# Patient Record
Sex: MALE | Race: BLACK OR AFRICAN AMERICAN | Employment: PART TIME | URBAN - METROPOLITAN AREA
[De-identification: names, ages, dates, MRNs, and addresses within clinical notes are randomized per-mention and may not be internally consistent; named-entity substitution may affect disease eponyms.]

---

## 2019-11-21 ENCOUNTER — HOSPITAL ENCOUNTER (OUTPATIENT)
Age: 49
Setting detail: OBSERVATION
Discharge: HOME | End: 2019-11-22
Attending: EMERGENCY MEDICINE | Admitting: INTERNAL MEDICINE
Payer: SUBSIDIZED

## 2019-11-21 DIAGNOSIS — E11.9 NEW ONSET TYPE 2 DIABETES MELLITUS (HCC): Primary | ICD-10-CM

## 2019-11-21 DIAGNOSIS — E86.0 DEHYDRATION: ICD-10-CM

## 2019-11-21 PROBLEM — E11.65 HYPERGLYCEMIA DUE TO TYPE 2 DIABETES MELLITUS (HCC): Status: ACTIVE | Noted: 2019-11-21

## 2019-11-21 LAB
ALBUMIN SERPL-MCNC: 3.9 G/DL (ref 3.5–5)
ALBUMIN/GLOB SERPL: 0.7 {RATIO} (ref 1.1–2.2)
ALP SERPL-CCNC: 152 U/L (ref 45–117)
ALT SERPL-CCNC: 28 U/L (ref 12–78)
ANION GAP SERPL CALC-SCNC: 17 MMOL/L (ref 5–15)
APPEARANCE UR: CLEAR
AST SERPL-CCNC: 14 U/L (ref 15–37)
BACTERIA URNS QL MICRO: NEGATIVE /HPF
BASOPHILS # BLD: 0 K/UL (ref 0–0.1)
BASOPHILS NFR BLD: 0 % (ref 0–1)
BILIRUB SERPL-MCNC: 0.7 MG/DL (ref 0.2–1)
BILIRUB UR QL: NEGATIVE
BUN SERPL-MCNC: 11 MG/DL (ref 6–20)
BUN/CREAT SERPL: 8 (ref 12–20)
CALCIUM SERPL-MCNC: 9.9 MG/DL (ref 8.5–10.1)
CHLORIDE SERPL-SCNC: 96 MMOL/L (ref 97–108)
CO2 SERPL-SCNC: 19 MMOL/L (ref 21–32)
COLOR UR: ABNORMAL
COMMENT, HOLDF: NORMAL
CREAT SERPL-MCNC: 1.38 MG/DL (ref 0.7–1.3)
DIFFERENTIAL METHOD BLD: ABNORMAL
EOSINOPHIL # BLD: 0 K/UL (ref 0–0.4)
EOSINOPHIL NFR BLD: 0 % (ref 0–7)
EPITH CASTS URNS QL MICRO: ABNORMAL /LPF
ERYTHROCYTE [DISTWIDTH] IN BLOOD BY AUTOMATED COUNT: 11.8 % (ref 11.5–14.5)
GLOBULIN SER CALC-MCNC: 5.9 G/DL (ref 2–4)
GLUCOSE BLD STRIP.AUTO-MCNC: 245 MG/DL (ref 65–100)
GLUCOSE BLD STRIP.AUTO-MCNC: 330 MG/DL (ref 65–100)
GLUCOSE BLD STRIP.AUTO-MCNC: 522 MG/DL (ref 65–100)
GLUCOSE SERPL-MCNC: 510 MG/DL (ref 65–100)
GLUCOSE UR STRIP.AUTO-MCNC: >1000 MG/DL
HCT VFR BLD AUTO: 43.6 % (ref 36.6–50.3)
HGB BLD-MCNC: 14.7 G/DL (ref 12.1–17)
HGB UR QL STRIP: NEGATIVE
HYALINE CASTS URNS QL MICRO: ABNORMAL /LPF (ref 0–5)
IMM GRANULOCYTES # BLD AUTO: 0.1 K/UL (ref 0–0.04)
IMM GRANULOCYTES NFR BLD AUTO: 1 % (ref 0–0.5)
KETONES UR QL STRIP.AUTO: >80 MG/DL
LEUKOCYTE ESTERASE UR QL STRIP.AUTO: NEGATIVE
LYMPHOCYTES # BLD: 3.5 K/UL (ref 0.8–3.5)
LYMPHOCYTES NFR BLD: 41 % (ref 12–49)
MCH RBC QN AUTO: 29.8 PG (ref 26–34)
MCHC RBC AUTO-ENTMCNC: 33.7 G/DL (ref 30–36.5)
MCV RBC AUTO: 88.4 FL (ref 80–99)
MONOCYTES # BLD: 0.7 K/UL (ref 0–1)
MONOCYTES NFR BLD: 8 % (ref 5–13)
NEUTS SEG # BLD: 4.2 K/UL (ref 1.8–8)
NEUTS SEG NFR BLD: 50 % (ref 32–75)
NITRITE UR QL STRIP.AUTO: NEGATIVE
NRBC # BLD: 0 K/UL (ref 0–0.01)
NRBC BLD-RTO: 0 PER 100 WBC
PH UR STRIP: 5 [PH] (ref 5–8)
PLATELET # BLD AUTO: 255 K/UL (ref 150–400)
PMV BLD AUTO: 12.1 FL (ref 8.9–12.9)
POTASSIUM SERPL-SCNC: 4.4 MMOL/L (ref 3.5–5.1)
PROT SERPL-MCNC: 9.8 G/DL (ref 6.4–8.2)
PROT UR STRIP-MCNC: NEGATIVE MG/DL
RBC # BLD AUTO: 4.93 M/UL (ref 4.1–5.7)
RBC #/AREA URNS HPF: ABNORMAL /HPF (ref 0–5)
SAMPLES BEING HELD,HOLD: NORMAL
SERVICE CMNT-IMP: ABNORMAL
SODIUM SERPL-SCNC: 132 MMOL/L (ref 136–145)
SP GR UR REFRACTOMETRY: 1.02 (ref 1–1.03)
TROPONIN I SERPL-MCNC: <0.05 NG/ML
UR CULT HOLD, URHOLD: NORMAL
UROBILINOGEN UR QL STRIP.AUTO: 0.2 EU/DL (ref 0.2–1)
WBC # BLD AUTO: 8.5 K/UL (ref 4.1–11.1)
WBC URNS QL MICRO: ABNORMAL /HPF (ref 0–4)

## 2019-11-21 PROCEDURE — 74011250636 HC RX REV CODE- 250/636: Performed by: EMERGENCY MEDICINE

## 2019-11-21 PROCEDURE — 96360 HYDRATION IV INFUSION INIT: CPT

## 2019-11-21 PROCEDURE — 99218 HC RM OBSERVATION: CPT

## 2019-11-21 PROCEDURE — 99285 EMERGENCY DEPT VISIT HI MDM: CPT

## 2019-11-21 PROCEDURE — 74011636637 HC RX REV CODE- 636/637: Performed by: EMERGENCY MEDICINE

## 2019-11-21 PROCEDURE — 80053 COMPREHEN METABOLIC PANEL: CPT

## 2019-11-21 PROCEDURE — 96361 HYDRATE IV INFUSION ADD-ON: CPT

## 2019-11-21 PROCEDURE — 36415 COLL VENOUS BLD VENIPUNCTURE: CPT

## 2019-11-21 PROCEDURE — 82962 GLUCOSE BLOOD TEST: CPT

## 2019-11-21 PROCEDURE — 81001 URINALYSIS AUTO W/SCOPE: CPT

## 2019-11-21 PROCEDURE — 74011636637 HC RX REV CODE- 636/637: Performed by: INTERNAL MEDICINE

## 2019-11-21 PROCEDURE — 85025 COMPLETE CBC W/AUTO DIFF WBC: CPT

## 2019-11-21 PROCEDURE — 84484 ASSAY OF TROPONIN QUANT: CPT

## 2019-11-21 RX ORDER — SODIUM CHLORIDE 9 MG/ML
75 INJECTION, SOLUTION INTRAVENOUS CONTINUOUS
Status: DISCONTINUED | OUTPATIENT
Start: 2019-11-21 | End: 2019-11-22 | Stop reason: HOSPADM

## 2019-11-21 RX ORDER — INSULIN LISPRO 100 [IU]/ML
2 INJECTION, SOLUTION INTRAVENOUS; SUBCUTANEOUS ONCE
Status: COMPLETED | OUTPATIENT
Start: 2019-11-21 | End: 2019-11-21

## 2019-11-21 RX ORDER — INSULIN LISPRO 100 [IU]/ML
INJECTION, SOLUTION INTRAVENOUS; SUBCUTANEOUS
Status: DISCONTINUED | OUTPATIENT
Start: 2019-11-22 | End: 2019-11-22 | Stop reason: HOSPADM

## 2019-11-21 RX ADMIN — SODIUM CHLORIDE 150 ML/HR: 900 INJECTION, SOLUTION INTRAVENOUS at 21:55

## 2019-11-21 RX ADMIN — HUMAN INSULIN 6 UNITS: 100 INJECTION, SOLUTION SUBCUTANEOUS at 21:51

## 2019-11-21 RX ADMIN — SODIUM CHLORIDE 1000 ML: 900 INJECTION, SOLUTION INTRAVENOUS at 19:42

## 2019-11-21 RX ADMIN — SODIUM CHLORIDE 1000 ML: 900 INJECTION, SOLUTION INTRAVENOUS at 20:23

## 2019-11-21 RX ADMIN — INSULIN LISPRO 2 UNITS: 100 INJECTION, SOLUTION INTRAVENOUS; SUBCUTANEOUS at 23:42

## 2019-11-21 RX ADMIN — HUMAN INSULIN 16 UNITS: 100 INJECTION, SOLUTION SUBCUTANEOUS at 19:56

## 2019-11-21 NOTE — LETTER
NOTIFICATION OF RETURN TO WORK /  
 
11/22/2019 Mr. Mac Dwyer 
[address] [place] [id number] To Whom It May Concern: Mac Dwyer admitted to the hospital on 11/21/2019 and discharged on 11/22/2019 He can return to work on 11/25/2019 If there are questions or concerns please have the patient contact our office. Sincerely, Giovanny Hamm MD

## 2019-11-22 ENCOUNTER — APPOINTMENT (OUTPATIENT)
Dept: GENERAL RADIOLOGY | Age: 49
End: 2019-11-22
Attending: INTERNAL MEDICINE
Payer: SUBSIDIZED

## 2019-11-22 VITALS
OXYGEN SATURATION: 97 % | TEMPERATURE: 98.3 F | HEIGHT: 66 IN | WEIGHT: 190 LBS | BODY MASS INDEX: 30.53 KG/M2 | RESPIRATION RATE: 16 BRPM | HEART RATE: 99 BPM | DIASTOLIC BLOOD PRESSURE: 71 MMHG | SYSTOLIC BLOOD PRESSURE: 108 MMHG

## 2019-11-22 LAB
ALBUMIN SERPL-MCNC: 3 G/DL (ref 3.5–5)
ALBUMIN/GLOB SERPL: 0.6 {RATIO} (ref 1.1–2.2)
ALP SERPL-CCNC: 108 U/L (ref 45–117)
ALT SERPL-CCNC: 22 U/L (ref 12–78)
ANION GAP SERPL CALC-SCNC: 11 MMOL/L (ref 5–15)
AST SERPL-CCNC: 12 U/L (ref 15–37)
ATRIAL RATE: 85 BPM
BASOPHILS # BLD: 0 K/UL (ref 0–0.1)
BASOPHILS NFR BLD: 1 % (ref 0–1)
BILIRUB SERPL-MCNC: 0.5 MG/DL (ref 0.2–1)
BUN SERPL-MCNC: 7 MG/DL (ref 6–20)
BUN/CREAT SERPL: 7 (ref 12–20)
CALCIUM SERPL-MCNC: 8.3 MG/DL (ref 8.5–10.1)
CALCULATED P AXIS, ECG09: 59 DEGREES
CALCULATED R AXIS, ECG10: 34 DEGREES
CALCULATED T AXIS, ECG11: 24 DEGREES
CHLORIDE SERPL-SCNC: 108 MMOL/L (ref 97–108)
CHOLEST SERPL-MCNC: 216 MG/DL
CO2 SERPL-SCNC: 21 MMOL/L (ref 21–32)
CREAT SERPL-MCNC: 1 MG/DL (ref 0.7–1.3)
DIAGNOSIS, 93000: NORMAL
DIFFERENTIAL METHOD BLD: NORMAL
EOSINOPHIL # BLD: 0 K/UL (ref 0–0.4)
EOSINOPHIL NFR BLD: 1 % (ref 0–7)
ERYTHROCYTE [DISTWIDTH] IN BLOOD BY AUTOMATED COUNT: 11.8 % (ref 11.5–14.5)
EST. AVERAGE GLUCOSE BLD GHB EST-MCNC: ABNORMAL MG/DL
GLOBULIN SER CALC-MCNC: 4.8 G/DL (ref 2–4)
GLUCOSE BLD STRIP.AUTO-MCNC: 200 MG/DL (ref 65–100)
GLUCOSE BLD STRIP.AUTO-MCNC: 238 MG/DL (ref 65–100)
GLUCOSE BLD STRIP.AUTO-MCNC: 250 MG/DL (ref 65–100)
GLUCOSE BLD STRIP.AUTO-MCNC: 276 MG/DL (ref 65–100)
GLUCOSE SERPL-MCNC: 175 MG/DL (ref 65–100)
HBA1C MFR BLD: >14 % (ref 4–5.6)
HCT VFR BLD AUTO: 36.9 % (ref 36.6–50.3)
HDLC SERPL-MCNC: 32 MG/DL
HDLC SERPL: 6.8 {RATIO} (ref 0–5)
HGB BLD-MCNC: 12.5 G/DL (ref 12.1–17)
IMM GRANULOCYTES # BLD AUTO: 0 K/UL (ref 0–0.04)
IMM GRANULOCYTES NFR BLD AUTO: 0 % (ref 0–0.5)
LDLC SERPL CALC-MCNC: 137 MG/DL (ref 0–100)
LIPID PROFILE,FLP: ABNORMAL
LYMPHOCYTES # BLD: 2.3 K/UL (ref 0.8–3.5)
LYMPHOCYTES NFR BLD: 43 % (ref 12–49)
MAGNESIUM SERPL-MCNC: 2 MG/DL (ref 1.6–2.4)
MCH RBC QN AUTO: 30 PG (ref 26–34)
MCHC RBC AUTO-ENTMCNC: 33.9 G/DL (ref 30–36.5)
MCV RBC AUTO: 88.7 FL (ref 80–99)
MONOCYTES # BLD: 0.6 K/UL (ref 0–1)
MONOCYTES NFR BLD: 12 % (ref 5–13)
NEUTS SEG # BLD: 2.3 K/UL (ref 1.8–8)
NEUTS SEG NFR BLD: 43 % (ref 32–75)
NRBC # BLD: 0 K/UL (ref 0–0.01)
NRBC BLD-RTO: 0 PER 100 WBC
P-R INTERVAL, ECG05: 134 MS
PHOSPHATE SERPL-MCNC: 2.7 MG/DL (ref 2.6–4.7)
PLATELET # BLD AUTO: 200 K/UL (ref 150–400)
PMV BLD AUTO: 12.1 FL (ref 8.9–12.9)
POTASSIUM SERPL-SCNC: 3.7 MMOL/L (ref 3.5–5.1)
PROT SERPL-MCNC: 7.8 G/DL (ref 6.4–8.2)
Q-T INTERVAL, ECG07: 372 MS
QRS DURATION, ECG06: 90 MS
QTC CALCULATION (BEZET), ECG08: 442 MS
RBC # BLD AUTO: 4.16 M/UL (ref 4.1–5.7)
SERVICE CMNT-IMP: ABNORMAL
SODIUM SERPL-SCNC: 140 MMOL/L (ref 136–145)
TRIGL SERPL-MCNC: 235 MG/DL (ref ?–150)
TROPONIN I SERPL-MCNC: <0.05 NG/ML
TSH SERPL DL<=0.05 MIU/L-ACNC: 2.81 UIU/ML (ref 0.36–3.74)
VENTRICULAR RATE, ECG03: 85 BPM
VLDLC SERPL CALC-MCNC: 47 MG/DL
WBC # BLD AUTO: 5.3 K/UL (ref 4.1–11.1)

## 2019-11-22 PROCEDURE — 74011250636 HC RX REV CODE- 250/636: Performed by: INTERNAL MEDICINE

## 2019-11-22 PROCEDURE — 83036 HEMOGLOBIN GLYCOSYLATED A1C: CPT

## 2019-11-22 PROCEDURE — 36415 COLL VENOUS BLD VENIPUNCTURE: CPT

## 2019-11-22 PROCEDURE — 83735 ASSAY OF MAGNESIUM: CPT

## 2019-11-22 PROCEDURE — 71045 X-RAY EXAM CHEST 1 VIEW: CPT

## 2019-11-22 PROCEDURE — 85025 COMPLETE CBC W/AUTO DIFF WBC: CPT

## 2019-11-22 PROCEDURE — 74011636637 HC RX REV CODE- 636/637: Performed by: HOSPITALIST

## 2019-11-22 PROCEDURE — 80061 LIPID PANEL: CPT

## 2019-11-22 PROCEDURE — 80053 COMPREHEN METABOLIC PANEL: CPT

## 2019-11-22 PROCEDURE — 99218 HC RM OBSERVATION: CPT

## 2019-11-22 PROCEDURE — 84100 ASSAY OF PHOSPHORUS: CPT

## 2019-11-22 PROCEDURE — 96372 THER/PROPH/DIAG INJ SC/IM: CPT

## 2019-11-22 PROCEDURE — 74011250637 HC RX REV CODE- 250/637: Performed by: HOSPITALIST

## 2019-11-22 PROCEDURE — 93005 ELECTROCARDIOGRAM TRACING: CPT

## 2019-11-22 PROCEDURE — 84484 ASSAY OF TROPONIN QUANT: CPT

## 2019-11-22 PROCEDURE — 82962 GLUCOSE BLOOD TEST: CPT

## 2019-11-22 PROCEDURE — 84443 ASSAY THYROID STIM HORMONE: CPT

## 2019-11-22 PROCEDURE — 74011636637 HC RX REV CODE- 636/637: Performed by: INTERNAL MEDICINE

## 2019-11-22 RX ORDER — ACETAMINOPHEN 325 MG/1
650 TABLET ORAL
Status: DISCONTINUED | OUTPATIENT
Start: 2019-11-22 | End: 2019-11-22 | Stop reason: HOSPADM

## 2019-11-22 RX ORDER — SODIUM CHLORIDE 0.9 % (FLUSH) 0.9 %
5-40 SYRINGE (ML) INJECTION AS NEEDED
Status: DISCONTINUED | OUTPATIENT
Start: 2019-11-22 | End: 2019-11-22 | Stop reason: HOSPADM

## 2019-11-22 RX ORDER — ATORVASTATIN CALCIUM 40 MG/1
40 TABLET, FILM COATED ORAL
Qty: 20 TAB | Refills: 0 | Status: SHIPPED | OUTPATIENT
Start: 2019-11-22 | End: 2019-11-22

## 2019-11-22 RX ORDER — LISINOPRIL 5 MG/1
5 TABLET ORAL DAILY
Status: DISCONTINUED | OUTPATIENT
Start: 2019-11-22 | End: 2019-11-22 | Stop reason: HOSPADM

## 2019-11-22 RX ORDER — LISINOPRIL 5 MG/1
5 TABLET ORAL DAILY
Qty: 20 TAB | Refills: 0 | Status: SHIPPED | OUTPATIENT
Start: 2019-11-23

## 2019-11-22 RX ORDER — LISINOPRIL 5 MG/1
5 TABLET ORAL DAILY
Qty: 20 TAB | Refills: 0 | Status: SHIPPED | OUTPATIENT
Start: 2019-11-23 | End: 2019-11-22

## 2019-11-22 RX ORDER — MAGNESIUM SULFATE 100 %
4 CRYSTALS MISCELLANEOUS AS NEEDED
Status: DISCONTINUED | OUTPATIENT
Start: 2019-11-22 | End: 2019-11-22 | Stop reason: HOSPADM

## 2019-11-22 RX ORDER — ATORVASTATIN CALCIUM 40 MG/1
40 TABLET, FILM COATED ORAL
Qty: 20 TAB | Refills: 0 | Status: SHIPPED | OUTPATIENT
Start: 2019-11-22

## 2019-11-22 RX ORDER — BISACODYL 5 MG
5 TABLET, DELAYED RELEASE (ENTERIC COATED) ORAL DAILY PRN
Status: DISCONTINUED | OUTPATIENT
Start: 2019-11-22 | End: 2019-11-22 | Stop reason: HOSPADM

## 2019-11-22 RX ORDER — DEXTROSE MONOHYDRATE 100 MG/ML
0-250 INJECTION, SOLUTION INTRAVENOUS AS NEEDED
Status: DISCONTINUED | OUTPATIENT
Start: 2019-11-22 | End: 2019-11-22 | Stop reason: HOSPADM

## 2019-11-22 RX ORDER — SODIUM CHLORIDE 0.9 % (FLUSH) 0.9 %
5-40 SYRINGE (ML) INJECTION EVERY 8 HOURS
Status: DISCONTINUED | OUTPATIENT
Start: 2019-11-22 | End: 2019-11-22 | Stop reason: HOSPADM

## 2019-11-22 RX ORDER — ATORVASTATIN CALCIUM 40 MG/1
40 TABLET, FILM COATED ORAL
Status: DISCONTINUED | OUTPATIENT
Start: 2019-11-22 | End: 2019-11-22 | Stop reason: HOSPADM

## 2019-11-22 RX ORDER — HEPARIN SODIUM 5000 [USP'U]/ML
5000 INJECTION, SOLUTION INTRAVENOUS; SUBCUTANEOUS EVERY 8 HOURS
Status: DISCONTINUED | OUTPATIENT
Start: 2019-11-22 | End: 2019-11-22 | Stop reason: HOSPADM

## 2019-11-22 RX ORDER — ONDANSETRON 2 MG/ML
4 INJECTION INTRAMUSCULAR; INTRAVENOUS
Status: DISCONTINUED | OUTPATIENT
Start: 2019-11-22 | End: 2019-11-22 | Stop reason: HOSPADM

## 2019-11-22 RX ADMIN — Medication 10 ML: at 14:00

## 2019-11-22 RX ADMIN — HUMAN INSULIN 10 UNITS: 100 INJECTION, SUSPENSION SUBCUTANEOUS at 09:55

## 2019-11-22 RX ADMIN — INSULIN LISPRO 5 UNITS: 100 INJECTION, SOLUTION INTRAVENOUS; SUBCUTANEOUS at 08:53

## 2019-11-22 RX ADMIN — HEPARIN SODIUM 5000 UNITS: 5000 INJECTION INTRAVENOUS; SUBCUTANEOUS at 00:35

## 2019-11-22 RX ADMIN — SODIUM CHLORIDE 150 ML/HR: 900 INJECTION, SOLUTION INTRAVENOUS at 05:13

## 2019-11-22 RX ADMIN — LISINOPRIL 5 MG: 5 TABLET ORAL at 10:01

## 2019-11-22 RX ADMIN — INSULIN LISPRO 5 UNITS: 100 INJECTION, SOLUTION INTRAVENOUS; SUBCUTANEOUS at 12:53

## 2019-11-22 RX ADMIN — HEPARIN SODIUM 5000 UNITS: 5000 INJECTION INTRAVENOUS; SUBCUTANEOUS at 09:55

## 2019-11-22 RX ADMIN — HUMAN INSULIN 14 UNITS: 100 INJECTION, SUSPENSION SUBCUTANEOUS at 17:03

## 2019-11-22 RX ADMIN — INSULIN LISPRO 3 UNITS: 100 INJECTION, SOLUTION INTRAVENOUS; SUBCUTANEOUS at 17:02

## 2019-11-22 NOTE — PROGRESS NOTES
Bedside shift change report given  Irish Coelho RN (oncoming nurse) by Kenneth Molina RN(offgoing nurse). Report given with SBAR.

## 2019-11-22 NOTE — DIABETES MGMT
[facility]    [facility] Consult Note    Recommendations/ Comments: Consult received for new dx. Admitted with BG's > 500 mg/dL yesterday. Improved to 200 mg/dL this AM.  He has received 10 units of lispro correction already today. Noted high BG post lunch = 300 mg/dL by meter when demonstrating meter technique. Pt is self-pay. Recommend  Novolin 70/30 ([employer] Reli On 70/30) with increased dose to 15 units BID on discharge    Dr Anam Banda regarding above    Current hospital DM medication: NPH 10 units BID                                                       Lispro normal sensitivity correction scale    Consult received for:  []             Assessment of home management                []      Medication Recommendations                []             Meter/monitoring     []             Insulin instruction     [x]             New diagnosis     []             Outpatient education     []             Insulin pump patient     []             Insulin infusion     []             DKA/HHS    Chart reviewed and initial evaluation complete on [facility]. Patient is a 47 y.o. male with new dx DM   Reports extreme thirst, dry mouth, frequent urination, decreased energy and weakness    Assessed and instructed patient on the following:   ·  interpretation of lab results, A1c > 14%  · Basic pathophys of DM and   · Hyperglycemia, symptoms  · blood sugar goals,  · complications of diabetes mellitus,  · hypoglycemia prevention and treatment,   · exercise,   · insulin - use of vial & syringe, site selection and rotation, storage of insulin   · illness management,   · SMBG skills,   · nutrition  - he has teat sugar and milk and toast for breakfast, he often skips lunch, he has vegetables and bread for supper. He does not eat sweets. He drinks sweetened tea and fruit juice.  Acceptable drinks choices discussed along with palte method for meal planning timing of meals and needs to eat 3 meals per day discussed. · F/U with [facility] 12-    Encouraged the following:   · Take medication as prescribed - take insulin before breakfast and before supper  · Use meter given today, then obtain [facility] [place] Reli On meter  · Test BG before breakfast and before supper  · Avoid sweet teat and fruit juice. Do not skip lunch - eat 3 meals per day. Add protein to all meals. · Appt at [address] [place] [address] 12-      Provided patient with the following: [x]             Diabetes Self Care Guide               [x]             Insulin education materials               []             CHO counting education materials               [x]             Outpatient DTC contact number               [x]             Glucometer Contour Next EZ               Patient was able to give return demonstration of    [x]       Glucometer    [x]       saline injection      with [x]     without []       assistance needed. []       Nurse to have patient self inject prior to discharge. Discussed with patient and/or family need for follow up appointment for diabetes management after discharge. A1c:   Lab Results   Component Value Date/Time    Hemoglobin A1c >14.0 (H) 11/22/2019 05:23 AM       Recent Glucose Results:   Lab Results   Component Value Date/Time     (H) 11/22/2019 05:23 AM     (H) 11/21/2019 07:26 PM    GLUCPOC 276 (H) 11/22/2019 11:11 AM    GLUCPOC 250 (H) 11/22/2019 08:45 AM    GLUCPOC 200 (H) 11/22/2019 06:49 AM        Lab Results   Component Value Date/Time    Creatinine 1.00 11/22/2019 05:23 AM     Estimated Creatinine Clearance: 91 mL/min (based on SCr of 1 mg/dL). Active Orders   Diet    DIET DIABETIC CONSISTENT CARB Regular        PO intake: No data found. Will continue to follow as needed. Thank you.   Kayleigh Burden RN, CDE    Time spent: 50 min

## 2019-11-22 NOTE — ED TRIAGE NOTES
Triage Note: Patient is coming in for decreased appetite, excessive thirst, urination, and dry mouth that started 1 week ago.

## 2019-11-22 NOTE — PROGRESS NOTES
CM paperwork completed for CM to pay for pt's prescriptions. CM took this paperwork to the OP pharmacy and they are filling pt's prescriptions. CM notified pt's nurse.  Tosin Faust

## 2019-11-22 NOTE — PROGRESS NOTES
Admission Medication Reconciliation:    Information obtained from:  Patient  RxQuery data available¹: yes    Comments/Recommendations: Updated PTA meds/reviewed patient's allergies. Patient denies taking any OTC, prescription medications and herbal supplements     ¹RxQuery pharmacy benefit data reflects medications filled and processed through the patient's insurance, however   this data does NOT capture whether the medication was picked up or is currently being taken by the patient. Allergies:  Patient has no known allergies. Significant PMH/Disease States: History reviewed. No pertinent past medical history. Chief Complaint for this Admission:    Chief Complaint   Patient presents with    High Blood Sugar     Prior to Admission Medications:   None       Please contact the main inpatient pharmacy with any questions or concerns at [phone] and we will direct you to the clinical pharmacist covering this patient's care while in-house.    Eleazar Delgado, PHARMD

## 2019-11-22 NOTE — ED PROVIDER NOTES
This patient presents with about 1 week of feeling severely thirsty, polyuria and polydipsia. He also has been losing weight. No pain anywhere. No history of diabetes. Negative surgical history. Non-smoker. He is a . His mother had diabetes. No vomiting or diarrhea. He has felt very weak and has had dry mouth. Nothing affects sx. Drinking more fluids does not either. He has no primary care physician or insurance right now. No headache. No chest pain. No burning with urination. Nothing seems to affect his symptoms. He has never had the symptoms before. He last saw an immigration doctor about 9 months ago. History reviewed. No pertinent past medical history. No history of diabetes  History reviewed. No pertinent surgical history. History reviewed. No pertinent family history.     Social History     Socioeconomic History    Marital status: SINGLE     Spouse name: Not on file    Number of children: Not on file    Years of education: Not on file    Highest education level: Not on file   Occupational History    Not on file   Social Needs    Financial resource strain: Not on file    Food insecurity:     Worry: Not on file     Inability: Not on file    Transportation needs:     Medical: Not on file     Non-medical: Not on file   Tobacco Use    Smoking status: Former Smoker    Smokeless tobacco: Never Used   Substance and Sexual Activity    Alcohol use: Yes     Comment: Soically    Drug use: Never    Sexual activity: Not on file   Lifestyle    Physical activity:     Days per week: Not on file     Minutes per session: Not on file    Stress: Not on file   Relationships    Social connections:     Talks on phone: Not on file     Gets together: Not on file     Attends [employer] service: Not on file     Active member of club or organization: Not on file     Attends meetings of clubs or organizations: Not on file     Relationship status: Not on file    Intimate partner violence:     Fear of current or ex partner: Not on file     Emotionally abused: Not on file     Physically abused: Not on file     Forced sexual activity: Not on file   Other Topics Concern    Not on file   Social History Narrative    Not on file         ALLERGIES: Patient has no known allergies. Review of Systems   All other systems reviewed and are negative. Vitals:    11/21/19 1914 11/21/19 1933 11/21/19 2000 11/21/19 2051   BP: (!) 158/115 156/90 (!) 166/98 (!) 162/97   Pulse: (!) 120 (!) 109 (!) 102 97   Resp: 20 19 18 20   Temp: 98.3 °F (36.8 °C)   98.1 °F (36.7 °C)   SpO2: 98% 97% 96% 94%   Weight: 86.2 kg (190 lb)      Height: 5' 6\" (1.676 m)               Physical Exam  Vitals signs and nursing note reviewed. Constitutional:       General: He is not in acute distress. Appearance: He is not ill-appearing, toxic-appearing or diaphoretic. HENT:      Head: Normocephalic. Nose: Nose normal.      Mouth/Throat:      Mouth: Mucous membranes are moist.      Pharynx: Oropharynx is clear. Eyes:      Conjunctiva/sclera: Conjunctivae normal.      Pupils: Pupils are equal, round, and reactive to light. Neck:      Comments: Trachea midline  Cardiovascular:      Rate and Rhythm: Regular rhythm. Tachycardia present. Pulses: Normal pulses. Pulmonary:      Effort: Pulmonary effort is normal.      Breath sounds: Normal breath sounds. Abdominal:      Palpations: Abdomen is soft. Tenderness: There is no tenderness. Musculoskeletal:         General: No swelling. Skin:     General: Skin is warm and dry. Neurological:      Mental Status: He is alert. Psychiatric:         Mood and Affect: Mood normal.          MDM       Procedures    Heart rhythm monitor interpretation: Sinus tachycardia, regular, rate of 110. No ectopy. Interpreted by me. Jenni Dakins, DO      7:40 pm -plan is IV fluids and subcutaneous insulin and recheck.          8:20 PM: Patient is feeling a little bit better. He has urinated. 9:35 PM - recheck is 330 - has a gap and bicarb is 19 - will admit. No insurance. No PCP. I don't think he will have good follow up. Hospitalist Vinh Serve for Admission  9:36 PM    ED Room Number: MT13/04  Patient Name and age:  Maia Lyn 47 y.o.  male  Working Diagnosis:   1. New onset type 2 diabetes mellitus ([address]    2. Dehydration      Readmission: no  Isolation Requirements:  no  Recommended Level of Care:  med/surg  Code Status:  Full Code  Department:[facility] Adult ED - 21   Other:  New onset  Bicarb 19  Gap 17  No follow up        Recent Results (from the past 12 hour(s))   GLUCOSE, POC    Collection Time: 11/21/19  7:20 PM   Result Value Ref Range    Glucose (POC) 522 (H) 65 - 100 mg/dL    Performed by Rk JAMES    CBC WITH AUTOMATED DIFF    Collection Time: 11/21/19  7:26 PM   Result Value Ref Range    WBC 8.5 4.1 - 11.1 K/uL    RBC 4.93 4.10 - 5.70 M/uL    HGB 14.7 12.1 - 17.0 g/dL    HCT 43.6 36.6 - 50.3 %    MCV 88.4 80.0 - 99.0 FL    MCH 29.8 26.0 - 34.0 PG    MCHC 33.7 30.0 - 36.5 g/dL    RDW 11.8 11.5 - 14.5 %    PLATELET 661 507 - 594 K/uL    MPV 12.1 8.9 - 12.9 FL    NRBC 0.0 0  WBC    ABSOLUTE NRBC 0.00 0.00 - 0.01 K/uL    NEUTROPHILS 50 32 - 75 %    LYMPHOCYTES 41 12 - 49 %    MONOCYTES 8 5 - 13 %    EOSINOPHILS 0 0 - 7 %    BASOPHILS 0 0 - 1 %    IMMATURE GRANULOCYTES 1 (H) 0.0 - 0.5 %    ABS. NEUTROPHILS 4.2 1.8 - 8.0 K/UL    ABS. LYMPHOCYTES 3.5 0.8 - 3.5 K/UL    ABS. MONOCYTES 0.7 0.0 - 1.0 K/UL    ABS. EOSINOPHILS 0.0 0.0 - 0.4 K/UL    ABS. BASOPHILS 0.0 0.0 - 0.1 K/UL    ABS. IMM.  GRANS. 0.1 (H) 0.00 - 0.04 K/UL    DF AUTOMATED     METABOLIC PANEL, COMPREHENSIVE    Collection Time: 11/21/19  7:26 PM   Result Value Ref Range    Sodium 132 (L) 136 - 145 mmol/L    Potassium 4.4 3.5 - 5.1 mmol/L    Chloride 96 (L) 97 - 108 mmol/L    CO2 19 (L) 21 - 32 mmol/L    Anion gap 17 (H) 5 - 15 mmol/L    Glucose 510 (H) 65 - 100 mg/dL BUN 11 6 - 20 MG/DL    Creatinine 1.38 (H) 0.70 - 1.30 MG/DL    BUN/Creatinine ratio 8 (L) 12 - 20      GFR est AA >60 >60 ml/min/1.73m2    GFR est non-AA 55 (L) >60 ml/min/1.73m2    Calcium 9.9 8.5 - 10.1 MG/DL    Bilirubin, total 0.7 0.2 - 1.0 MG/DL    ALT (SGPT) 28 12 - 78 U/L    AST (SGOT) 14 (L) 15 - 37 U/L    Alk. phosphatase 152 (H) 45 - 117 U/L    Protein, total 9.8 (H) 6.4 - 8.2 g/dL    Albumin 3.9 3.5 - 5.0 g/dL    Globulin 5.9 (H) 2.0 - 4.0 g/dL    A-G Ratio 0.7 (L) 1.1 - 2.2     SAMPLES BEING HELD    Collection Time: 11/21/19  7:26 PM   Result Value Ref Range    SAMPLES BEING HELD 1RED,1BLUE     COMMENT        Add-on orders for these samples will be processed based on acceptable specimen integrity and analyte stability, which may vary by analyte. URINALYSIS W/MICROSCOPIC    Collection Time: 11/21/19  8:24 PM   Result Value Ref Range    Color YELLOW/STRAW      Appearance CLEAR CLEAR      Specific gravity 1.020 1.003 - 1.030      pH (UA) 5.0 5.0 - 8.0      Protein NEGATIVE  NEG mg/dL    Glucose >1,000 (A) NEG mg/dL    Ketone >80 (A) NEG mg/dL    Bilirubin NEGATIVE  NEG      Blood NEGATIVE  NEG      Urobilinogen 0.2 0.2 - 1.0 EU/dL    Nitrites NEGATIVE  NEG      Leukocyte Esterase NEGATIVE  NEG      WBC 0-4 0 - 4 /hpf    RBC 0-5 0 - 5 /hpf    Epithelial cells FEW FEW /lpf    Bacteria NEGATIVE  NEG /hpf    Hyaline cast 0-2 0 - 5 /lpf   URINE CULTURE HOLD SAMPLE    Collection Time: 11/21/19  8:24 PM   Result Value Ref Range    Urine culture hold        URINE ON HOLD IN MICROBIOLOGY DEPT FOR 3 DAYS. IF UNPRESERVED URINE IS SUBMITTED, IT CANNOT BE USED FOR ADDITIONAL TESTING AFTER 24 HRS, RECOLLECTION WILL BE REQUIRED.    GLUCOSE, POC    Collection Time: 11/21/19  9:33 PM   Result Value Ref Range    Glucose (POC) 330 (H) 65 - 100 mg/dL    Performed by Jennifer Raza

## 2019-11-22 NOTE — PROGRESS NOTES
JORGE ALBERTO- Home with relatives. Pt has a financial sceening appointment with [facility] for 12 noon on 12/5/19. Reason for Admission:   New diabetes                   RRAT Score:      7               Plan for utilizing home health:   not                       Current Advanced Directive/Advance Care Plan: Not on file. Transition of Care Plan:    CM met with pt to introduce him to CM role. This pt currently lives with a relative. He stated that he is independent. Per pt, he drives, so he has transportation. He works as a  for [employer], but he does not have [employer]. CM discussed this with pt and we discussed his need for a PCP. We discussed the [facility] and pt would like to follow up with them. CM called the [facility] and spoke with Meryl Blanton. CM set up an appointment for a financial screening for 12/5/19 at 12 noon at the [place] [address] location. CM gave this pt a [facility] application with instructions to complete the application, take a photo ID and the last 2 months proof of income. He verbalized understanding. Pt stated that he has no money to fill prescriptions should he need them at d/c. CM will assist with this after prescriptions are written. Will follow. KARLA Dunlap,ACM-SW  Observation notice provided in writing to patient and/or caregiver as well as verbal explanation of the policy. Patients who are in outpatient status also receive the Observation notice. Care Management Interventions  PCP Verified by CM:  Yes  Palliative Care Criteria Met (RRAT>21 & CHF Dx)?: No  Transition of Care Consult (CM Consult): Discharge Planning  MyChart Signup: No  Discharge Durable Medical Equipment: No  Physical Therapy Consult: Yes  Occupational Therapy Consult: Yes  Speech Therapy Consult: No  Current Support Network: Relative's Home  Confirm Follow Up Transport: Family  Plan discussed with Pt/Family/Caregiver: Yes  Freedom of Choice Offered: Yes  [employer] Information Provided?: No

## 2019-11-22 NOTE — H&P
[address]  HISTORY AND PHYSICAL    Name:  Radha Berg  MR#:  [id number]  :  1970  ACCOUNT #:  [de-identified]  ADMIT DATE:  2019      The patient was seen, evaluated and admitted by me on 2019. PRIMARY CARE PHYSICIAN:  None. SOURCE OF INFORMATION:  The patient and the patient's spouse who was present at the bedside. CHIEF COMPLAINT:  Weight loss and excessive thirst.    HISTORY OF PRESENT ILLNESS:  This is a 58-year-old man with no significant past medical history, was in his usual state of health until about a week ago when the patient developed excessive thirst, polyuria and polydipsia. The patient also stated that he has lost some weight. The patient cannot quantify the amount of weight he has lost.  The patient denies fever, rigors and chills. The patient underwent immigration physical examination about 6 months ago. The patient was not told that he had any abnormal lab result. The patient came to the emergency room for further evaluation of these symptoms. When the patient arrived at the emergency room, the patient was found to have elevated blood sugar as well as elevated blood pressure. The patient has no primary care physician. The emergency room physician is concerned that the patient may not have a proper followup if discharged from the emergency room. Because of that, the patient was referred to the hospitalist service for evaluation for admission. No record of prior admission to this hospital.  The patient has no fever, no rigors, no chills. PAST MEDICAL HISTORY:  Not significant. ALLERGIES:  NO KNOWN DRUG ALLERGIES. MEDICATIONS:  The patient is not on any medication at home. FAMILY HISTORY:  This was reviewed. His mother had diabetes. PAST SURGICAL HISTORY:  None. SOCIAL HISTORY:  The patient is a former smoker. No history of alcohol or tobacco abuse.     REVIEW OF SYSTEMS:  HEAD, EYES, EARS, NOSE AND THROAT:  No headache, no dizziness, no blurring of vision, no photophobia. RESPIRATORY SYSTEM:  No cough, no shortness of breath, no hemoptysis. CARDIOVASCULAR SYSTEM:  No chest pain, no orthopnea, no palpitation. GASTROINTESTINAL SYSTEM:  No nausea or vomiting. No diarrhea. No constipation. GENITOURINARY SYSTEM:  No dysuria, no urgency and no frequency. All other systems are reviewed and they are negative. PHYSICAL EXAMINATION:  GENERAL APPEARANCE:  The patient appeared ill in moderate distress. VITAL SIGNS:  On arrival at the emergency room, temperature 98.3, pulse 120, respiratory rate 20, blood pressure 158/115, oxygen saturation 98% on room air. HEAD:  Normocephalic, atraumatic. EYES:  Normal eye movements. No redness, no drainage, no discharge. EARS:  Normal external ears with no evidence of drainage. NOSE:  No deformity, no drainage. MOUTH AND THROAT:  No visible oral lesion. Dry oral mucosa. NECK:  Neck is supple. No JVD, no thyromegaly. CHEST:  Clear breath sounds. No wheezing, no crackles. HEART:  Normal S1 and S2, regular. No clinically appreciable murmur. ABDOMEN:  Soft, nontender, normal bowel sounds. CNS:  Alert, oriented x3. No gross focal neurological deficit. EXTREMITIES:  No edema. Pulses 2+ bilaterally. MUSCULOSKELETAL SYSTEM:  No evidence of joint deformity or swelling. SKIN:  No active skin lesions seen in the exposed parts of the body. PSYCHIATRY:  Normal mood and affect. LYMPHATIC SYSTEM:  No cervical lymphadenopathy. DIAGNOSTIC DATA:  None. LABORATORY DATA:  Hematology:  WBC 8.5, hemoglobin at 14.7, hematocrit 43.6, platelets of 123. Chemistry:  Sodium 132, potassium 4.4, chloride 96, CO2 19, glucose 510, BUN 11, creatinine 1.38, calcium 9.9, total bilirubin 0.7, ALT 28, AST 14, alkaline phosphatase 152, total protein 9.8, albumin level 3.9, globulin at 5.9.   Urinalysis, this is significant for negative blood, negative nitrite, negative leukocyte esterase, negative bacteria. ASSESSMENT:  1. Type 2 diabetes with hyperglycemia. 2.  Elevated blood pressure. 3.  Hyponatremia. 4.  Acute kidney injury. PLAN:  1. Type 2 diabetes with hyperglycemia. This is a new-onset type 2 diabetes. We will admit the patient for further evaluation and treatment. We will carry out fluid therapy. We will start the patient on sliding scale with insulin coverage. The patient will require oral medication for diabetes at the time of discharge home. We will check hemoglobin A1c level. The patient will also require diabetic education before the patient is discharged home. 2.  Elevated blood pressure. We will monitor the patient's blood pressure closely. The patient most likely has new-onset essential hypertension. The patient will require antihypertensive medication at the time of discharge home, preferably ACE inhibitor because of his new diagnosis of type 2 diabetes. 3.  Hyponatremia. We will carry out fluid therapy and repeat the patient's sodium level. 4.  Acute kidney injury. This is most likely due to volume depletion. We will carry out fluid therapy and monitor the patient's renal function. OTHER ISSUES:  Code status, the patient is a full code. We will place the patient on heparin for DVT prophylaxis. FUNCTIONAL STATUS PRIOR TO ADMISSION:  The patient is ambulatory without assistant or device.       Alona Kimball MD      [id number]  D:  11/22/2019 3:18  T:  11/22/2019 3:56  JOB #:  [id number]

## 2019-11-22 NOTE — PROGRESS NOTES
NUTRITION COMPLETE ASSESSMENT    RECOMMENDATIONS:   1. Consistent CHO Diet  2. RD instructed on CHO Counting (2100 kcal, 75-90 gm CHO/meal); printed education provided     Interventions/Plan:   Food/Nutrient Delivery:           Meals and snacks  Nutrition Education:     Coordination of Care:    Nutrition Counseling:        Assessment:   Reason for Assessment:   [x]BPA/MST (score 2) Referral     Diet: Consistent carb  Supplements: N/A; Usual appetite good  Nutritionally Significant Medications: [x] Reviewed & Includes: New NPH + lispro; Lipitor  Meal Intake: No data found. Subjective:  Usual appetite good, recent decline (new onset type 2 DM); #, lost 5#. Work in [address] [place] [address],[address] and they usually give me meals. Do my own cooking. Like orange juice and fruit, no soda, like vegetables. Don't eat desserts and don't like sweet foods. Objective:  History reviewed. No pertinent past medical history. Admit new onset hyperglycemia. A1C >14 and  on admit. Mild unplanned wt loss ~5# with polydipsia, polyuria and polyphagia. Works in [address] [place] [address],[address], usually skips breakfast, drinks a little tea with milk. 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 Pt from [facility] ([place] and no cultural dietary preferences/needs. BMI ~31 obese. No GI concerns; chew/swallow without difficulty. Rx lipitor. Provided initial CHO counting diet educ. Planned diet based on ~2100 kcal (~45% CHO), 230 gm CHO or ~75-90 gm CHO per meal.  Provided printed diet education materials and pt practiced reading food labels, using Smart Phone for CHO counting and planning sample meals. Pt was able to teach-back with minimal assistance and appeared eager to learn about the diet. Estimated Nutrition Needs:   Kcals/day: 2165 Kcals/day  Protein: 90 g(~1 gm/kg)  Fluid: 2200 ml(~1 mL/kcal)     Based On: Amy Heller(MSJ x 1.3)  Weight Used:  Other (comment)(86.2 kg (190#) Pt stated wt)    Pt expected to meet estimated nutrient needs:  [x]   Yes Comparative Standards:  ;  ;      Nutrition Diagnosis:   1. Altered nutrition-related lab values related to new onset hyperglycemia as evidenced by Admit  with polydipsia, polyphagia and 5# unplanned wt loss; A1C>14      Goals:      or less by discharge and appetite % all meals. Monitoring & Evaluation:    - Protein intake, Carbohydrate intake, Total energy intake   - Lean body mass, fat free mass, Glucose profile, Weight/weight change   - Food/nutrition knowledge, Readiness to change    Previous Nutrition Goals Met:  N/A  Previous Recommendations:      N/A    Education & Discharge Needs:   [x] Identified and addressed: Basic CHO counting, label reading for total CHO, basic meal planning with CHO counting    [x] Participated in care plan, discharge planning, and/or interdisciplinary rounds        Cultural, Baptism and ethnic food preferences identified:   None([place] lived in [address] [place] [address],[address] ~3 yrs)    Skin Integrity: [x]Intact    Edema: [x]None   Last BM: 11/21/19  Food Allergies: [x]None; Tolerates lactose and drinks milk     Anthropometrics:    Weight Loss Metrics 11/21/2019   Today's Wt 190 lb   BMI 30.67 kg/m2      Last 3 Recorded Weights in this Encounter    11/21/19 1914   Weight: 86.2 kg (190 lb)      Weight Source: Patient stated  Height: 5' 6\" (167.6 cm),    Body mass index is 30.67 kg/m².      IBW : 64.4 kg (142 lb),    Usual Body Weight: 88.5 kg (195 lb),      Labs:    Lab Results   Component Value Date/Time    Sodium 140 11/22/2019 05:23 AM    Potassium 3.7 11/22/2019 05:23 AM    Chloride 108 11/22/2019 05:23 AM    CO2 21 11/22/2019 05:23 AM    Glucose 175 (H) 11/22/2019 05:23 AM    BUN 7 11/22/2019 05:23 AM    Creatinine 1.00 11/22/2019 05:23 AM    Calcium 8.3 (L) 11/22/2019 05:23 AM    Magnesium 2.0 11/22/2019 05:23 AM    Phosphorus 2.7 11/22/2019 05:23 AM    Albumin 3.0 (L) 11/22/2019 05:23 AM     Lab Results   Component Value Date/Time    Hemoglobin A1c >14.0 (H) 11/22/2019 05:23 AM Lab Results   Component Value Date/Time    Glucose 175 (H) 11/22/2019 05:23 AM    Glucose (POC) 276 (H) 11/22/2019 11:11 AM      Lab Results   Component Value Date/Time    ALT (SGPT) 22 11/22/2019 05:23 AM    AST (SGOT) 12 (L) 11/22/2019 05:23 AM    Alk.  phosphatase 108 11/22/2019 05:23 AM    Bilirubin, total 0.5 11/22/2019 05:23 AM        Candy Mccoy RD

## 2019-11-22 NOTE — ROUTINE PROCESS
Primary Nurse Nj Rodriguez RN and Robbie Barnes RN performed a dual skin assessment on this patient No impairment noted Steven score is 23

## 2019-11-22 NOTE — PROGRESS NOTES
CM obtained pt's prescriptions and took them to the pharmacy to have them filled. The CM department will have these filled for this pt.  Nelson Craft

## 2019-11-22 NOTE — ED NOTES
TRANSFER - OUT REPORT:    Verbal report given to [place] (name) on Cristiano Saldana  being transferred to  (unit) for routine progression of care       Report consisted of patients Situation, Background, Assessment and   Recommendations(SBAR). Information from the following report(s) SBAR, ED Summary and Recent Results was reviewed with the receiving nurse. Lines:   Peripheral IV 11/21/19 Left Antecubital (Active)   Site Assessment Clean, dry, & intact 11/21/2019  7:27 PM   Phlebitis Assessment 0 11/21/2019  7:27 PM   Infiltration Assessment 0 11/21/2019  7:27 PM   Dressing Status Clean, dry, & intact 11/21/2019  7:27 PM   Dressing Type Transparent 11/21/2019  7:27 PM   Hub Color/Line Status Pink;Capped;Flushed;Patent 11/21/2019  7:27 PM   Action Taken Blood drawn 11/21/2019  7:27 PM        Opportunity for questions and clarification was provided.       Patient transported with:   [employer]

## 2019-11-23 ENCOUNTER — HOSPITAL ENCOUNTER (EMERGENCY)
Age: 49
Discharge: HOME | End: 2019-11-23
Attending: EMERGENCY MEDICINE | Admitting: EMERGENCY MEDICINE
Payer: SUBSIDIZED

## 2019-11-23 VITALS
OXYGEN SATURATION: 98 % | HEART RATE: 87 BPM | TEMPERATURE: 98.2 F | DIASTOLIC BLOOD PRESSURE: 74 MMHG | RESPIRATION RATE: 15 BRPM | SYSTOLIC BLOOD PRESSURE: 127 MMHG

## 2019-11-23 DIAGNOSIS — R22.0 SWELLING OF UPPER LIP: ICD-10-CM

## 2019-11-23 DIAGNOSIS — T78.3XXA ANGIOEDEMA, INITIAL ENCOUNTER: Primary | ICD-10-CM

## 2019-11-23 LAB
ANION GAP SERPL CALC-SCNC: 11 MMOL/L (ref 5–15)
BASOPHILS # BLD: 0 K/UL (ref 0–0.1)
BASOPHILS NFR BLD: 0 % (ref 0–1)
BUN SERPL-MCNC: 7 MG/DL (ref 6–20)
BUN/CREAT SERPL: 7 (ref 12–20)
CALCIUM SERPL-MCNC: 8.6 MG/DL (ref 8.5–10.1)
CHLORIDE SERPL-SCNC: 104 MMOL/L (ref 97–108)
CO2 SERPL-SCNC: 20 MMOL/L (ref 21–32)
COMMENT, HOLDF: NORMAL
CREAT SERPL-MCNC: 0.99 MG/DL (ref 0.7–1.3)
DIFFERENTIAL METHOD BLD: ABNORMAL
EOSINOPHIL # BLD: 0 K/UL (ref 0–0.4)
EOSINOPHIL NFR BLD: 1 % (ref 0–7)
ERYTHROCYTE [DISTWIDTH] IN BLOOD BY AUTOMATED COUNT: 11.8 % (ref 11.5–14.5)
GLUCOSE SERPL-MCNC: 290 MG/DL (ref 65–100)
HCT VFR BLD AUTO: 43.1 % (ref 36.6–50.3)
HGB BLD-MCNC: 14.3 G/DL (ref 12.1–17)
IMM GRANULOCYTES # BLD AUTO: 0 K/UL (ref 0–0.04)
IMM GRANULOCYTES NFR BLD AUTO: 1 % (ref 0–0.5)
LYMPHOCYTES # BLD: 1.9 K/UL (ref 0.8–3.5)
LYMPHOCYTES NFR BLD: 36 % (ref 12–49)
MCH RBC QN AUTO: 29.7 PG (ref 26–34)
MCHC RBC AUTO-ENTMCNC: 33.2 G/DL (ref 30–36.5)
MCV RBC AUTO: 89.6 FL (ref 80–99)
MONOCYTES # BLD: 0.5 K/UL (ref 0–1)
MONOCYTES NFR BLD: 9 % (ref 5–13)
NEUTS SEG # BLD: 2.9 K/UL (ref 1.8–8)
NEUTS SEG NFR BLD: 53 % (ref 32–75)
NRBC # BLD: 0 K/UL (ref 0–0.01)
NRBC BLD-RTO: 0 PER 100 WBC
PLATELET # BLD AUTO: 184 K/UL (ref 150–400)
PMV BLD AUTO: 12.8 FL (ref 8.9–12.9)
POTASSIUM SERPL-SCNC: 4.8 MMOL/L (ref 3.5–5.1)
RBC # BLD AUTO: 4.81 M/UL (ref 4.1–5.7)
SAMPLES BEING HELD,HOLD: NORMAL
SODIUM SERPL-SCNC: 135 MMOL/L (ref 136–145)
WBC # BLD AUTO: 5.3 K/UL (ref 4.1–11.1)

## 2019-11-23 PROCEDURE — 74011250637 HC RX REV CODE- 250/637: Performed by: EMERGENCY MEDICINE

## 2019-11-23 PROCEDURE — 85025 COMPLETE CBC W/AUTO DIFF WBC: CPT

## 2019-11-23 PROCEDURE — 80048 BASIC METABOLIC PNL TOTAL CA: CPT

## 2019-11-23 PROCEDURE — 36415 COLL VENOUS BLD VENIPUNCTURE: CPT

## 2019-11-23 PROCEDURE — 74011636637 HC RX REV CODE- 636/637: Performed by: EMERGENCY MEDICINE

## 2019-11-23 PROCEDURE — 99285 EMERGENCY DEPT VISIT HI MDM: CPT

## 2019-11-23 RX ORDER — FAMOTIDINE 20 MG/1
20 TABLET, FILM COATED ORAL
Status: COMPLETED | OUTPATIENT
Start: 2019-11-23 | End: 2019-11-23

## 2019-11-23 RX ORDER — PREDNISONE 20 MG/1
40 TABLET ORAL
Status: COMPLETED | OUTPATIENT
Start: 2019-11-23 | End: 2019-11-23

## 2019-11-23 RX ORDER — PREDNISONE 20 MG/1
40 TABLET ORAL
Qty: 10 TAB | Refills: 0 | Status: SHIPPED | OUTPATIENT
Start: 2019-11-23 | End: 2019-11-26

## 2019-11-23 RX ORDER — DIPHENHYDRAMINE HYDROCHLORIDE 50 MG/ML
25 INJECTION, SOLUTION INTRAMUSCULAR; INTRAVENOUS
Status: DISCONTINUED | OUTPATIENT
Start: 2019-11-23 | End: 2019-11-23 | Stop reason: SDUPTHER

## 2019-11-23 RX ORDER — FAMOTIDINE 10 MG/ML
20 INJECTION INTRAVENOUS
Status: DISCONTINUED | OUTPATIENT
Start: 2019-11-23 | End: 2019-11-23 | Stop reason: SDUPTHER

## 2019-11-23 RX ORDER — DIPHENHYDRAMINE HCL 25 MG
25 CAPSULE ORAL
Qty: 20 CAP | Refills: 0 | Status: SHIPPED | OUTPATIENT
Start: 2019-11-23

## 2019-11-23 RX ORDER — DIPHENHYDRAMINE HCL 25 MG
25 CAPSULE ORAL
Status: COMPLETED | OUTPATIENT
Start: 2019-11-23 | End: 2019-11-23

## 2019-11-23 RX ORDER — SODIUM CHLORIDE 9 MG/ML
INJECTION INTRAMUSCULAR; INTRAVENOUS; SUBCUTANEOUS
Status: DISCONTINUED
Start: 2019-11-23 | End: 2019-11-23 | Stop reason: WASHOUT

## 2019-11-23 RX ORDER — FAMOTIDINE 20 MG/1
20 TABLET, FILM COATED ORAL 2 TIMES DAILY
Qty: 10 TAB | Refills: 0 | Status: SHIPPED | OUTPATIENT
Start: 2019-11-23 | End: 2019-11-28

## 2019-11-23 RX ADMIN — FAMOTIDINE 20 MG: 20 TABLET ORAL at 13:30

## 2019-11-23 RX ADMIN — DIPHENHYDRAMINE HYDROCHLORIDE 25 MG: 25 CAPSULE ORAL at 13:30

## 2019-11-23 RX ADMIN — PREDNISONE 40 MG: 20 TABLET ORAL at 13:29

## 2019-11-23 NOTE — ED NOTES
Unable to obtain IV access despite three nurses attempting; MD informed, pt able to swallow, changed route to PO.

## 2019-11-23 NOTE — PROGRESS NOTES
Reviewed prescriptions, signs and symptoms to report and gave opportunity for questions. Patient left via wheelchair with volunteer assistance and driven home by family member. ;

## 2019-11-23 NOTE — ED TRIAGE NOTES
Pt was discharged yesterday and \"I woke up with a swollen (upper) lip and that's why I came back\". Denies sob, chest pain, throat tightness, a&ox3, ambulatory. In no visible distress, completes full sentences without issue. Pt states he received for the first time yesterday Lisinopril, Insulin, Lipitor for the first time. States he also was sneezing a lot yesterday- timing unclear.

## 2019-11-23 NOTE — DISCHARGE INSTRUCTIONS
Patient Education        Angioedema: Care Instructions  Your Care Instructions    Angioedema is an allergic reaction. It causes swelling and welts in the deep layers of the skin. Angioedema can sometimes occur along with hives. Hives are an allergic reaction in the outer layers of the skin. Angioedema can range from mild to severe. Painful welts can develop on the face. Angioedema can also occur on other parts of the body. In severe cases, the inside of the throat can swell and make it hard to breathe. Many things can cause this condition, including foods, insect bites, and medicines (such as aspirin and some blood pressure medicines). It also can run in families. Sometimes you may know what caused the reaction, but other times you may not know. Follow-up care is a key part of your treatment and safety. Be sure to make and go to all appointments, and call your doctor if you are having problems. It's also a good idea to know your test results and keep a list of the medicines you take. How can you care for yourself at home? · Take your medicines exactly as prescribed. Call your doctor if you think you are having a problem with your medicine. You will get more details on the specific medicines your doctor prescribes. Some medicines used to treat angioedema can make you too sleepy to drive safely. Do not drive if you take medicine that may make you sleepy. · Avoid foods or medicine that may have triggered the swelling. · For comfort:  ? Try taking a cool bath. Or place a cool, wet towel on the swollen area. ? Avoid hot baths and showers. ? Wear loose clothing. · Your doctor may prescribe a shot of epinephrine to carry with you in case you have a severe reaction. Learn how to give yourself the shot and keep it with you at all times. Make sure it has not . When should you call for help?   Give an epinephrine shot if:    · You think you are having a severe allergic reaction.    After giving an epinephrine shot call 911, even if you feel better.   Call 911 if:    · You have symptoms of a severe allergic reaction. These may include:  ? Sudden raised, red areas (hives) all over your body. ? Swelling of the throat, mouth, lips, or tongue. ? Trouble breathing. ? Passing out (losing consciousness). Or you may feel very lightheaded or suddenly feel weak, confused, or restless.     · You have been given an epinephrine shot, even if you feel better.    Call your doctor now or seek immediate medical care if:    · You have symptoms of an allergic reaction, such as:  ? A rash or hives (raised, red areas on the skin). ? Itching. ? Swelling. ? Belly pain, nausea, or vomiting.    Watch closely for changes in your health, and be sure to contact your doctor if:    · You do not get better as expected. Where can you learn more? Go to http://raven-angelica.info/. Enter F283 in the search box to learn more about \"Angioedema: Care Instructions. \"  Current as of: April 7, 2019  Content Version: 12.2  © 3135-0652 [employer]. Care instructions adapted under license by [employer] (which disclaims liability or warranty for this information). If you have questions about a medical condition or this instruction, always ask your healthcare professional. [address] any warranty or liability for your use of this information.

## 2019-11-23 NOTE — ED NOTES
1126 Bedside shift change report given to Barbara (oncoming nurse) by Daniella Parikh (offgoing nurse). Report included the following information SBAR.

## 2019-11-23 NOTE — ED PROVIDER NOTES
58-year-old -American male presents to the emergency department with top lip swelling. Patient reports he was admitted to the hospital 2 days ago with new onset diabetes mellitus. He was discharged yesterday on new medications that included lisinopril. He woke up this morning with top lip swollen. He denies any swelling of his throat or tongue. Normal breathing. No shortness of breath. No chest pain. Patient denies fevers. No abdominal pain. No vomiting or diarrhea. He has never had issues like this before. He just took his first dose of lisinopril yesterday. Patient denies any mouth pain. He denies any fevers. No other concerns. Only complaint is swelling of the upper lip. Past Medical History:   Diagnosis Date    Diabetes ([facility] [address]     Ill-defined condition     high cholesterol       History reviewed. No pertinent surgical history. History reviewed. No pertinent family history.     Social History     Socioeconomic History    Marital status: SINGLE     Spouse name: Not on file    Number of children: Not on file    Years of education: Not on file    Highest education level: Not on file   Occupational History    Not on file   Social Needs    Financial resource strain: Not on file    Food insecurity:     Worry: Not on file     Inability: Not on file    Transportation needs:     Medical: Not on file     Non-medical: Not on file   Tobacco Use    Smoking status: Former Smoker    Smokeless tobacco: Never Used   Substance and Sexual Activity    Alcohol use: Yes     Comment: Soically    Drug use: Never    Sexual activity: Not on file   Lifestyle    Physical activity:     Days per week: Not on file     Minutes per session: Not on file    Stress: Not on file   Relationships    Social connections:     Talks on phone: Not on file     Gets together: Not on file     Attends Worship service: Not on file     Active member of club or organization: Not on file     Attends meetings of clubs or organizations: Not on file     Relationship status: Not on file    Intimate partner violence:     Fear of current or ex partner: Not on file     Emotionally abused: Not on file     Physically abused: Not on file     Forced sexual activity: Not on file   Other Topics Concern    Not on file   Social History Narrative    Not on file         ALLERGIES: Patient has no known allergies. Review of Systems   Constitutional: Negative for fever. HENT: Positive for facial swelling. Negative for congestion. Eyes: Negative for pain. Respiratory: Negative for cough and shortness of breath. Cardiovascular: Negative for chest pain. Gastrointestinal: Negative for abdominal pain. Endocrine: Negative for polyuria. Genitourinary: Negative for flank pain. Musculoskeletal: Negative for neck pain. Skin: Negative for color change. Allergic/Immunologic: Negative for immunocompromised state. Neurological: Negative for headaches. Hematological: Does not bruise/bleed easily. Psychiatric/Behavioral: Negative for confusion. All other systems reviewed and are negative. There were no vitals filed for this visit. Physical Exam  Vitals signs and nursing note reviewed. Constitutional:       General: He is not in acute distress. Appearance: He is well-developed. He is not diaphoretic. HENT:      Head: Normocephalic and atraumatic. Comments: Top lip is swollen, tongue is normal, oropharyx is clear and patent, no trismus, normal voice     Right Ear: External ear normal.      Left Ear: External ear normal.   Eyes:      Pupils: Pupils are equal, round, and reactive to light. Neck:      Musculoskeletal: Normal range of motion and neck supple. Vascular: No JVD. Trachea: No tracheal deviation. Cardiovascular:      Rate and Rhythm: Normal rate and regular rhythm. Heart sounds: Normal heart sounds. No murmur. No friction rub. No gallop.     Pulmonary:      Effort: Pulmonary effort is normal. No respiratory distress. Breath sounds: Normal breath sounds. No stridor. No wheezing or rales. Abdominal:      General: Bowel sounds are normal. There is no distension. Palpations: Abdomen is soft. Tenderness: There is no tenderness. There is no guarding or rebound. Musculoskeletal: Normal range of motion. General: No tenderness. Skin:     General: Skin is warm and dry. Findings: No erythema or rash. Neurological:      Mental Status: He is alert and oriented to person, place, and time. Cranial Nerves: No cranial nerve deficit. Coordination: Coordination normal.      Deep Tendon Reflexes: Reflexes are normal and symmetric. Psychiatric:         Behavior: Behavior normal.         Thought Content: Thought content normal.         Judgment: Judgment normal.          MDM  Number of Diagnoses or Management Options  Diagnosis management comments: Patient has isolated swelling of the upper lip. He was started on lisinopril recently. Will give steroids, Benadryl, Pepcid. Will monitor closely. Will check basic blood work. Will reassess shortly. Patient agrees.        Amount and/or Complexity of Data Reviewed  Clinical lab tests: ordered and reviewed  Tests in the medicine section of CPT®: ordered and reviewed  Decide to obtain previous medical records or to obtain history from someone other than the patient: yes  Review and summarize past medical records: yes  Independent visualization of images, tracings, or specimens: yes           Procedures    2:23 PM  Pt feeling a bit better  Mild upper lip swelling  No throat swelling  Lungs are clear  o2 sat is 100%    3:41 PM  Pt is much improved  Lip is better    Home w/ steroids and antihistimines and no lisiopril going forward

## 2019-11-25 NOTE — DISCHARGE SUMMARY
Discharge Summary       PATIENT ID: Fredy Grider  MRN: [id number]   YOB: 1970    DATE OF ADMISSION: 11/21/2019  7:25 PM    DATE OF DISCHARGE: 11/22/2019  PRIMARY CARE PROVIDER: None     ATTENDING PHYSICIAN: Anam Conrad MD  DisCHARGING PROVIDER: Funmilayo Charles MD    To contact this individual call [phone] and ask the  to page. If unavailable ask to be transferred the Adult Hospitalist Department. CONSULTATIONS: None    PROCEDURES/SURGERIES: * No surgery found *    ADMITTING DIAGNOSES & HOSPITAL COURSE:   This is a 71-year-old man with no significant past medical history, was in his usual state of health until about a week ago when the patient developed excessive thirst, polyuria and polydipsia. The patient also stated that he has lost some weight. The patient cannot quantify the amount of weight he has lost.  The patient denies fever, rigors and chills. The patient underwent immigration physical examination about 6 months ago. The patient was not told that he had any abnormal lab result. The patient came to the emergency room for further evaluation of these symptoms. When the patient arrived at the emergency room, the patient was found to have elevated blood sugar as well as elevated blood pressure. The patient has no primary care physician. Patient received insulin at the time of admission and BS improved from 500 to 200s on 11/22. Received IVF and cr trended down. Patient was started on NPH 12 U BID along with sliding scale. I discussed with the patient about the new diagnosis of diabetes -management of blood sugars with insulin,check blood sugars,diet management and complications of uncontrolled blood sugars. Dscussed with  and we arranged a follow up at [facility] for further care and obtaining insulin. Information was provided to patient.  Diabetes teaching team was consulted - patient received meter, education of insulin administration etc. He received lisinopril on 11/22 morning as he had  elevated BP at admission and also diabetic . Started on statin due to high LDL. Patient tolerated all the medications. As patient was concerned about affordablity of medications as he did not start working yet - prescriptions were filled in hospital via [facility] prior to discharge      [address] [facility] [address] / PLAN:    #Type 2 diabetes -uncontrolled -new diagnosis  #Hyperlipidemia  #HTN  #JOVANI-resolved      PENDING TEST RESULTS:   At the time of discharge the following test results are still pending: none        FOLLOW UP APPOINTMENTS:    Follow-up Information     Follow up With Specialties Details Why Contact Info    [facility]  On 12/5/2019 Your appointment is at 12 noon. Please bring the completed application, photo ID, and proof of income for the last 2 months.  [facility]    None    None ([phone]) Patient stated that they have no PCP             ADDITIONAL CARE RECOMMENDATIONS:  -take 70/30 insulin  - 15 Units two times daily  -Check blood sugars two -three times daily and maintain a log book with the values,take it to your PCP for further dose adjustment of insulin  -Check blood sugar if you feel dizzy,nausea,heart racing etc -symptoms of low blood sugar.  -Eat at regular times when taking insulin.  -We added statin as your cholesterol is high  -Lisinopril ia the medication to control BP  -YOU HAVE TO FOLLOW UP WITH YOUR PCP FOR FURTHER MANAAGEMENT    DIET: diabetic    ACTIVITY: Activity as tolerated    WOUND CARE: na    EQUIPMENT needed: na          DISCHARGE MEDICATIONS:  Discharge Medication List as of 11/22/2019  6:04 PM      CONTINUE these medications which have CHANGED    Details   glucose blood VI test strips (CONTOUR NEXT TEST STRIPS) strip Check blood sugar two times daily, Print, Disp-100 Strip, R-0      insulin NPH/insulin regular (HUMULIN 70/30 U-100 INSULIN) 100 unit/mL (70-30) injection 15 Units by SubCUTAneous route Before breakfast and dinner., Print, Disp-10 mL, R-0      atorvastatin (LIPITOR) 40 mg tablet Take 1 Tab by mouth nightly. , Print, Disp-20 Tab, R-0      lisinopril (PRINIVIL, ZESTRIL) 5 mg tablet Take 1 Tab by mouth daily. , Print, Disp-20 Tab, R-0               NOTIFY YOUR PHYSICIAN FOR ANY OF THE FOLLOWING:   Fever over 101 degrees for 24 hours. Chest pain, shortness of breath, fever, chills, nausea, vomiting, diarrhea, change in mentation, falling, weakness, bleeding. Severe pain or pain not relieved by medications. Or, any other signs or symptoms that you may have questions about.     DISPOSITION:   X Home With:   OT  PT  HH  RN       Long term SNF/Inpatient Rehab    Independent/assisted living    Hospice    Other:       PATIENT CONDITION AT DISCHARGE:     Functional status    Poor     Deconditioned    X Independent      Cognition   X  Lucid     Forgetful     Dementia      Catheters/lines (plus indication)    Orellana     PICC     PEG    XX None      Code status    X Full code     DNR      PHYSICAL EXAMINATION AT DISCHARGE:  Gen:  Well-developed, well-nourished, in no acute distress  HEENT:   EOMI,anicteric, no pallor,hearing intact to voice, moist mucous membranes,sinus non tender  Resp:  No accessory muscle use, clear breath sounds without wheezes rales or rhonchi  Card:  No murmurs, normal S1, S2  Abd:  Soft, non-tender, non-distended, normoactive bowel sounds are present  Neuro: alert and oriented X 3, muscle strength wnl      CHRONIC MEDICAL DIAGNOSES:  Problem List as of 11/22/2019 Date Reviewed: 11/21/2019          Codes Class Noted - Resolved    * (Principal) Hyperglycemia due to type 2 diabetes mellitus ([facility] ICD-10-CM: E11.65  ICD-9-CM: 250.00  11/21/2019 - Present              40 minutes were spent with the patient on counseling and coordination of care    Signed:   Kia Toussaint MD  11/25/2019  9:11 AM

## 2019-11-25 NOTE — DISCHARGE INSTRUCTIONS
Patient Education   Patient Education             Learning About Type 2 Diabetes  What is type 2 diabetes? Insulin is a hormone that helps your body use sugar from your food as energy. Type 2 diabetes happens when your body can't use insulin the right way. Over time, the pancreas can't make enough insulin. If you don't have enough insulin, too much sugar stays in your blood. If you are overweight, get little or no exercise, or have type 2 diabetes in your family, you are more likely to have problems with the way insulin works in your body.  Americans, Hispanics, Native Americans,  Americans, and Pacific Islanders have a higher risk for type 2 diabetes. Type 2 diabetes can be prevented or delayed with a healthy lifestyle, which includes staying at a healthy weight, making smart food choices, and getting regular exercise. What can you expect with type 2 diabetes? Shlomo Pathak keep hearing about how important it is to keep your blood sugar within a target range. That's because over time, high blood sugar can lead to serious problems. It can:  · Harm your eyes, nerves, and kidneys. · Damage your blood vessels, leading to heart disease and stroke. · Reduce blood flow and cause nerve damage to parts of your body, especially your feet. This can cause slow healing and pain when you walk. · Make your immune system weak and less able to fight infections. When people hear the word \"diabetes,\" they often think of problems like these. But daily care and treatment can help prevent or delay these problems. The goal is to keep your blood sugar in a target range. That's the best way to reduce your chance of having more problems from diabetes. What are the symptoms? Some people who have type 2 diabetes may not have any symptoms early on. Many people with the disease don't even know they have it at first. But with time, diabetes starts to cause symptoms.  You experience most symptoms of type 2 diabetes when your blood sugar is either too high or too low. The most common symptoms of high blood sugar include:  · Thirst.  · Frequent urination. · Weight loss. · Blurry vision. The symptoms of low blood sugar include:  · Sweating. · Shakiness. · Weakness. · Hunger. · Confusion. How can you prevent type 2 diabetes? The best way to prevent or delay type 2 diabetes is to adopt healthy habits, which include:  · Staying at a healthy weight. · Exercising regularly. · Eating healthy foods. How is type 2 diabetes treated? If you have type 2 diabetes, here are the most important things you can do. · Take your diabetes medicines. · Check your blood sugar as often as your doctor recommends. Also, get a hemoglobin A1c test at least every 6 months. · Try to eat a variety of foods and to spread carbohydrate throughout the day. Carbohydrate raises blood sugar higher and more quickly than any other nutrient does. Carbohydrate is found in sugar, breads and cereals, fruit, starchy vegetables such as potatoes and corn, and milk and yogurt. · Get at least 30 minutes of exercise on most days of the week. Walking is a good choice. You also may want to do other activities, such as running, swimming, cycling, or playing tennis or team sports. If your doctor says it's okay, do muscle-strengthening exercises at least 2 times a week. · See your doctor for checkups and tests on a regular schedule. · If you have high blood pressure or high cholesterol, take the medicines as prescribed by your doctor. · Do not smoke. Smoking can make health problems worse. This includes problems you might have with type 2 diabetes. If you need help quitting, talk to your doctor about stop-smoking programs and medicines. These can increase your chances of quitting for good. Follow-up care is a key part of your treatment and safety. Be sure to make and go to all appointments, and call your doctor if you are having problems.  It's also a good idea to know your test results and keep a list of the medicines you take. Where can you learn more? Go to http://raven-angelica.info/. Enter J322 in the search box to learn more about \"Learning About Type 2 Diabetes. \"  Current as of: April 16, 2019  Content Version: 12.2  © 4901-3316 [employer]. Care instructions adapted under license by [employer] (which disclaims liability or warranty for this information). If you have questions about a medical condition or this instruction, always ask your healthcare professional. [place] [address] any warranty or liability for your use of this information. Discharge Instructions       PATIENT ID: Phil Simeon  MRN: [id number]   YOB: 1970    DATE OF ADMISSION: 11/21/2019  7:25 PM    DATE OF DISCHARGE: 11/22/2019    PRIMARY CARE PROVIDER: None     ATTENDING PHYSICIAN: Villa Burroughs MD  DISCHARGING PROVIDER: Shan Ventura MD    To contact this individual call [phone] and ask the  to page. If unavailable ask to be transferred the Adult Hospitalist Department. DISCHARGE DIAGNOSES -Type 2 diabetes, HTN    CONSULTATIONS: None    PROCEDURES/SURGERIES: * No surgery found *    PENDING TEST RESULTS:   At the time of discharge the following test results are still pending: none    FOLLOW UP APPOINTMENTS:   Follow-up Information     Follow up With Specialties Details Why Contact Info    [facility]  On 12/5/2019 Your appointment is at 12 noon. Please bring the completed application, photo ID, and proof of income for the last 2 months.  [address] [facility]           ADDITIONAL CARE RECOMMENDATIONS:  -take 70/30 insulin  - 15 Units two times daily  -Check blood sugars two -three times daily and maintain a log book with the values,take it to your PCP for further dose adjustment of insulin  -Check blood sugar if you feel dizzy,nausea,heart racing etc -symptoms of low blood sugar.  -Eat at regular times when taking insulin.  -We added statin as your cholesterol is high  -Lisinopril ia the medication to control BP  -YOU HAVE TO FOLLOW UP WITH YOUR PCP FOR FURTHER MANAAGEMENT    DIET: diabetic    ACTIVITY: Activity as tolerated    WOUND CARE: na    EQUIPMENT needed: na      DISCHARGE MEDICATIONS:   See Medication Reconciliation Form    · It is important that you take the medication exactly as they are prescribed. · Keep your medication in the bottles provided by the pharmacist and keep a list of the medication names, dosages, and times to be taken in your wallet. · Do not take other medications without consulting your doctor. NOTIFY YOUR PHYSICIAN FOR ANY OF THE FOLLOWING:   Fever over 101 degrees for 24 hours. Chest pain, shortness of breath, fever, chills, nausea, vomiting, diarrhea, change in mentation, falling, weakness, bleeding. Severe pain or pain not relieved by medications. Or, any other signs or symptoms that you may have questions about.       DISPOSITION:  X  Home With:   OT  PT  HH  RN       SNF/Inpatient Rehab/LTAC    Independent/assisted living    Hospice    Other:         Signed:   Nazia Cortes MD  11/22/2019  4:07 PM

## 2019-12-05 ENCOUNTER — HOSPITAL ENCOUNTER (OUTPATIENT)
Dept: LAB | Age: 49
Discharge: HOME | End: 2019-12-05

## 2019-12-05 PROCEDURE — 80061 LIPID PANEL: CPT

## 2019-12-06 LAB
CHOLEST SERPL-MCNC: 198 MG/DL
HDLC SERPL-MCNC: 49 MG/DL
HDLC SERPL: 4 {RATIO} (ref 0–5)
LDLC SERPL CALC-MCNC: 126.6 MG/DL (ref 0–100)
LIPID PROFILE,FLP: ABNORMAL
TRIGL SERPL-MCNC: 112 MG/DL (ref ?–150)
VLDLC SERPL CALC-MCNC: 22.4 MG/DL

## 2020-10-08 ENCOUNTER — HOSPITAL ENCOUNTER (OUTPATIENT)
Dept: LAB | Age: 50
Discharge: HOME | End: 2020-10-08

## 2020-10-08 LAB
ALBUMIN SERPL-MCNC: 4.1 G/DL (ref 3.5–5)
ALBUMIN/GLOB SERPL: 1.1 {RATIO} (ref 1.1–2.2)
ALP SERPL-CCNC: 64 U/L (ref 45–117)
ALT SERPL-CCNC: 31 U/L (ref 12–78)
ANION GAP SERPL CALC-SCNC: 4 MMOL/L (ref 5–15)
AST SERPL-CCNC: 23 U/L (ref 15–37)
BILIRUB SERPL-MCNC: 0.8 MG/DL (ref 0.2–1)
BUN SERPL-MCNC: 9 MG/DL (ref 6–20)
BUN/CREAT SERPL: 12 (ref 12–20)
CALCIUM SERPL-MCNC: 9 MG/DL (ref 8.5–10.1)
CHLORIDE SERPL-SCNC: 104 MMOL/L (ref 97–108)
CHOLEST SERPL-MCNC: 114 MG/DL
CO2 SERPL-SCNC: 28 MMOL/L (ref 21–32)
CREAT SERPL-MCNC: 0.77 MG/DL (ref 0.7–1.3)
GLOBULIN SER CALC-MCNC: 3.7 G/DL (ref 2–4)
GLUCOSE SERPL-MCNC: 93 MG/DL (ref 65–100)
HDLC SERPL-MCNC: 40 MG/DL
HDLC SERPL: 2.9 {RATIO} (ref 0–5)
LDLC SERPL CALC-MCNC: 60.6 MG/DL (ref 0–100)
LIPID PROFILE,FLP: NORMAL
POTASSIUM SERPL-SCNC: 4.3 MMOL/L (ref 3.5–5.1)
PROT SERPL-MCNC: 7.8 G/DL (ref 6.4–8.2)
SODIUM SERPL-SCNC: 136 MMOL/L (ref 136–145)
TRIGL SERPL-MCNC: 67 MG/DL (ref ?–150)
VLDLC SERPL CALC-MCNC: 13.4 MG/DL

## 2020-10-08 PROCEDURE — 80061 LIPID PANEL: CPT

## 2020-10-08 PROCEDURE — 80053 COMPREHEN METABOLIC PANEL: CPT

## 2021-01-22 ENCOUNTER — NURSE TRIAGE (OUTPATIENT)
Dept: OTHER | Facility: CLINIC | Age: 51
End: 2021-01-22

## 2021-01-22 NOTE — TELEPHONE ENCOUNTER
Reason for Disposition   [1] HIGH RISK patient (e.g., age > 59 years, diabetes, heart or lung disease, weak immune system) AND [2] new or worsening symptoms    Answer Assessment - Initial Assessment Questions  1. COVID-19 DIAGNOSIS: \"Who made your Coronavirus (COVID-19) diagnosis? \" \"Was it confirmed by a positive lab test?\" If not diagnosed by a HCP, ask \"Are there lots of cases (community spread) where you live? \" (See public health department website, if unsure)      No confirmed diagnosis; Not a lot of cases in community    2. COVID-19 EXPOSURE: \"Was there any known exposure to COVID before the symptoms began? \" CDC Definition of close contact: within 6 feet (2 meters) for a total of 15 minutes or more over a 24-hour period. No known exposure    3. ONSET: \"When did the COVID-19 symptoms start?\"       1/15/2021    4. WORST SYMPTOM: \"What is your worst symptom? \" (e.g., cough, fever, shortness of breath, muscle aches)      Weakness    5. COUGH: \"Do you have a cough? \" If so, ask: \"How bad is the cough? \"        Yes, \"not bad\" \"not coughing a lot\"    6. FEVER: \"Do you have a fever? \" If so, ask: \"What is your temperature, how was it measured, and when did it start? \"      Patient has not measured his temperature, but reports he was \"hot and sweaty\". 7. RESPIRATORY STATUS: \"Describe your breathing? \" (e.g., shortness of breath, wheezing, unable to speak)       Normal per patient    8. BETTER-SAME-WORSE: Letty Blanchard you getting better, staying the same or getting worse compared to yesterday? \"  If getting worse, ask, \"In what way? \"      Patient reports feeling better than yesterday    9. HIGH RISK DISEASE: \"Do you have any chronic medical problems? \" (e.g., asthma, heart or lung disease, weak immune system, obesity, etc.)      DM    10. PREGNANCY: \"Is there any chance you are pregnant? \" \"When was your last menstrual period? \"        N/A    11. OTHER SYMPTOMS: \"Do you have any other symptoms? \"  (e.g., chills, fatigue, headache, loss of smell or taste, muscle pain, sore throat; new loss of smell or taste especially support the diagnosis of COVID-19)        Weakness, loss of smell    Protocols used: CORONAVIRUS (COVID-19) DIAGNOSED OR SUSPECTED-ADULT-    Call received on [facility] [address] Brief description of triage: See above    Triage indicates for patient to call PCP now. Patient also given [employer] tele health information as alternate if PCP is not available. Care advice provided. Recommended using local department of health website for testing locations and up to date information. Caller verbalizes understanding, denies any other questions or concerns; instructed to call back for any new or worsening symptoms.